# Patient Record
Sex: FEMALE | Race: WHITE | URBAN - METROPOLITAN AREA
[De-identification: names, ages, dates, MRNs, and addresses within clinical notes are randomized per-mention and may not be internally consistent; named-entity substitution may affect disease eponyms.]

---

## 2018-08-01 ENCOUNTER — OFFICE VISIT (OUTPATIENT)
Dept: FAMILY MEDICINE CLINIC | Age: 23
End: 2018-08-01

## 2018-08-01 VITALS
RESPIRATION RATE: 18 BRPM | TEMPERATURE: 98.6 F | HEIGHT: 62 IN | OXYGEN SATURATION: 98 % | WEIGHT: 148.1 LBS | SYSTOLIC BLOOD PRESSURE: 96 MMHG | BODY MASS INDEX: 27.25 KG/M2 | HEART RATE: 108 BPM | DIASTOLIC BLOOD PRESSURE: 68 MMHG

## 2018-08-01 DIAGNOSIS — Z3A.30 30 WEEKS GESTATION OF PREGNANCY: Primary | ICD-10-CM

## 2018-08-01 NOTE — PROGRESS NOTES
Kaykay Nguyễn  Identified pt with two pt identifiers(name and ). Chief Complaint Patient presents with  
 Other Referral for OB/GYN 1. Have you been to the ER, urgent care clinic since your last visit? Hospitalized since your last visit? no 
 
2. Have you seen or consulted any other health care providers outside of the [address] [place] [address] since your last visit? Include any pap smears or colon screening. no 
 
Today's provider has been notified of reason for visit, vitals and flowsheets obtained on patients. Patient received paperwork for advance directive during previous visit but has not completed at this time Reviewed record In preparation for visit, huddled with provider and have obtained necessary documentation Health Maintenance Due Topic  HPV Age 9Y-34Y (1 of 3 - Female 3 Dose Series)  DTaP/Tdap/Td series (1 - Tdap)  PAP AKA CERVICAL CYTOLOGY  Influenza Age 5 to Adult Wt Readings from Last 3 Encounters:  
18 148 lb 1.6 oz (67.2 kg) Temp Readings from Last 3 Encounters:  
18 98.6 °F (37 °C) (Temporal) BP Readings from Last 3 Encounters:  
18 96/68 Pulse Readings from Last 3 Encounters:  
18 (!) 108 Vitals:  
 18 0935 BP: 96/68 Pulse: (!) 108 Resp: 18 Temp: 98.6 °F (37 °C) TempSrc: Temporal  
SpO2: 98% Weight: 148 lb 1.6 oz (67.2 kg) Height: 5' 1.75\" (1.568 m) PainSc:   0 - No pain LMP: 2017 Learning Assessment: 
:  
 
Learning Assessment 2018 PRIMARY LEARNER Patient BARRIERS PRIMARY LEARNER NONE  
CO-LEARNER CAREGIVER No  
PRIMARY LANGUAGE ENGLISH  
LEARNER PREFERENCE PRIMARY READING  
ANSWERED BY patient RELATIONSHIP SELF Depression Screening: 
:  
 
PHQ over the last two weeks 2018 Little interest or pleasure in doing things Not at all Feeling down, depressed, irritable, or hopeless Not at all Total Score PHQ 2 0 Fall Risk Assessment: 
: Fall Risk Assessment, last 12 mths 8/1/2018 Able to walk? No  
 
 
Abuse Screening: 
:  
 
Abuse Screening Questionnaire 8/1/2018 Do you ever feel afraid of your partner? Bradley Amezquita Are you in a relationship with someone who physically or mentally threatens you? Bradley Amezquita Is it safe for you to go home? Y  
 
 
ADL Screening: 
:  
 
ADL Assessment 8/1/2018 Feeding yourself No Help Needed Getting from bed to chair No Help Needed Getting dressed No Help Needed Bathing or showering No Help Needed Walk across the room (includes cane/walker) No Help Needed Using the telphone No Help Needed Taking your medications No Help Needed Preparing meals No Help Needed Managing money (expenses/bills) No Help Needed Moderately strenuous housework (laundry) No Help Needed Shopping for personal items (toiletries/medicines) No Help Needed Shopping for groceries No Help Needed Driving No Help Needed Climbing a flight of stairs No Help Needed Getting to places beyond walking distances No Help Needed Medication reconciliation up to date and corrected with patient at this time.

## 2018-08-01 NOTE — PROGRESS NOTES
Chief Complaint Patient presents with  
 Other Referral for OB/GYN   
 
 
HPI: 
Esther Kerr is a 20y.o. year old female who is a new patient and is here to establish care. She was previously followed by PCP/OB in [place]. Her  is deployed at this time but she will return no [place] in December 2018. She is new to [facility] as of Monday, 8/30/2018. She is 30 weeks pregnant and needs urgent referral to OBGYN, Dr. Kate Vargas at [address] [place] [address]. The following sections were reviewed & updated as appropriate: PMH, PSH, PL, FMH, and SH. Past Medical History:  
Diagnosis Date  IBS (irritable bowel syndrome)  Vaginal delivery 05/25/2016 Normal healthy boy History reviewed. No pertinent surgical history. Patient Active Problem List  
Diagnosis Code  IBS (irritable bowel syndrome) K58.9  30 weeks gestation of pregnancy Z3A.30 Family History Problem Relation Age of Onset  No Known Problems Mother  Diabetes Father  Ulcerative Colitis Sister  No Known Problems Brother  No Known Problems Maternal Grandmother  No Known Problems Maternal Grandfather  No Known Problems Paternal Grandmother  No Known Problems Paternal Grandfather Social History Social History  Marital status: UNKNOWN Spouse name: N/A  
 Number of children: N/A  
 Years of education: N/A Occupational History  Not on file. Social History Main Topics  Smoking status: Never Smoker  Smokeless tobacco: Never Used  Alcohol use No  
 Drug use: No  
 Sexual activity: Yes  
  Partners: Female Birth control/ protection: None Other Topics Concern  Not on file Social History Narrative  No narrative on file Prior to Admission medications Medication Sig Start Date End Date Taking? Authorizing Provider  
prenatal vit-calcium-iron-fa (PRENATAL PLUS WITH CALCIUM) 27 mg iron- 1 mg tab Take 1 Tab by mouth daily.    Yes Historical Provider Allergies Allergen Reactions  Penicillins Rash Review of Systems A comprehensive review of systems was negative except for that written in the HPI. Objective: 
 
  
Visit Vitals  BP 96/68 (BP 1 Location: Right arm, BP Patient Position: Sitting)  Pulse (!) 108  Temp 98.6 °F (37 °C) (Temporal)  Resp 18  Ht 5' 1.75\" (1.568 m)  Wt 148 lb 1.6 oz (67.2 kg)  LMP 12/29/2017  SpO2 98%  BMI 27.31 kg/m2 Physical Exam 
Gen: alert, oriented, no acute distress Head: normocephalic, atraumatic Ears: external auditory canals clear, TMs without erythema or effusion Eyes: pupils equal round reactive to light, sclera clear, conjunctiva clear Oral: moist mucus membranes, no oral lesions, no pharyngeal inflammation or exudate Neck: symmetric normal sized thyroid, no carotid bruits, no jugular vein distention Resp: no increase work of breathing, lungs clear to ausculation bilaterally, no wheezing, rales or rhonchi CV: S1, S2 normal.  No murmurs, rubs, or gallops. Abd: soft, not tender, not distended. No hepatosplenomegaly. Normal bowel sounds. No hernias. No abdominal or renal bruits. Neuro: cranial nerves intact, normal strength and movement in all extremities, reflexes and sensation intact and symmetric. Skin: no lesion or rash Extremities: no cyanosis or edema Assessment & Plan: ICD-10-CM ICD-9-CM 1. 30 weeks gestation of pregnancy Z3A.30 V22.2 REFERRAL TO OBSTETRICS AND GYNECOLOGY Follow-up Disposition: 
Return if symptoms worsen or fail to improve. reviewed diet, exercise and weight control 
reviewed medications and side effects in detail Patient already has an appointment scheduled with Dr. Kendra De La Fuente on Wednesday, 8/8/2018. Pt asked to have records sent to us after her visit. Differential diagnosis and treatment options reviewed with patient who is in agreement with treatment plan as outlined below.  
 
Health Maintenance reviewed - deferred, awaiting results. Recommended healthy diet low in carbohydrates, fats, sodium and cholesterol. Recommended regular cardiovascular exercise 3-6 times per week for 30-60 minutes daily. Chart is reviewed and updated today in the office. Records requested for other providers patient has seen and is currently seeing. Patient was offered a choice/choices in the treatment plan today. Patient expresses understanding of the plan and agrees with recommendations. Verbal and written instructions (see AVS) provided. See patient instructions for more. Patient expresses understanding of diagnosis and treatment plan.

## 2018-08-01 NOTE — MR AVS SNAPSHOT
[address] 
[address] [place] [address] 
[phone] Patient: Michelle Form MRN: [id number] EMQ:[id number] Visit Information Date & Time Provider Department Dept. Phone Encounter #  
 8/1/2018  9:10 AM Steven Pope NP [facility] [phone] [id number] Follow-up Instructions Return if symptoms worsen or fail to improve. Upcoming Health Maintenance Date Due  
 HPV Age 9Y-34Y (1 of 3 - Female 3 Dose Series) 9/13/2018* DTaP/Tdap/Td series (1 - Tdap) 9/20/2018* Influenza Age 5 to Adult 10/17/2018* PAP AKA CERVICAL CYTOLOGY 11/15/2018* *Topic was postponed. The date shown is not the original due date. Allergies as of 8/1/2018  Review Complete On: 8/1/2018 By: Steven Pope NP Severity Noted Reaction Type Reactions Penicillins High 08/01/2018    Rash Current Immunizations  Never Reviewed No immunizations on file. Not reviewed this visit You Were Diagnosed With   
  
 Codes Comments 30 weeks gestation of pregnancy    -  Primary ICD-10-CM: Z3A.30 ICD-9-CM: V22.2 Vitals BP Pulse Temp Resp Height(growth percentile) Weight(growth percentile) 96/68 (BP 1 Location: Right arm, BP Patient Position: Sitting) (!) 108 98.6 °F (37 °C) (Temporal) 18 5' 1.75\" (1.568 m) 148 lb 1.6 oz (67.2 kg) LMP SpO2 BMI OB Status Smoking Status 12/29/2017 98% 27.31 kg/m2 Pregnant Never Smoker BMI and BSA Data Body Mass Index Body Surface Area  
 27.31 kg/m 2 1.71 m 2 Your Updated Medication List  
  
   
This list is accurate as of 8/1/18 10:10 AM.  Always use your most recent med list.  
  
  
  
  
 prenatal vit-calcium-iron-fa 27 mg iron- 1 mg Tab Commonly known as:  PRENATAL PLUS with CALCIUM Take 1 Tab by mouth daily. We Performed the Following REFERRAL TO OBSTETRICS AND GYNECOLOGY [REF51 Custom] Follow-up Instructions Return if symptoms worsen or fail to improve. Referral Information Referral ID Referred By Referred To  
  
 [id number] MD Suzette Curry [address] [place] [address] Phone: [phone] Fax: [phone] Visits Status Start Date End Date 1 New Request 18 If your referral has a status of pending review or denied, additional information will be sent to support the outcome of this decision. Patient Instructions Weeks 30 to 32 of Your Pregnancy: Care Instructions Your Care Instructions You have made it to the final months of your pregnancy. By now, your baby is really starting to look like a baby, with hair and plump skin. As you enter the final weeks of pregnancy, the reality of having a baby may start to set in. This is the time to settle on a name, get your household in order, set up a safe nursery, and find quality  if needed. Doing these things in advance will allow you to focus on caring for and enjoying your new baby. You may also want to have a tour of your hospital's labor and delivery unit to get a better idea of what to expect while you are in the hospital. 
During these last months, it is very important to take good care of yourself and pay attention to what your body needs. If your doctor says it is okay for you to work, don't push yourself too hard. Use the tips provided in this care sheet to ease heartburn and care for varicose veins. If you haven't already had the Tdap shot during this pregnancy, talk to your doctor about getting it. It will help protect your  against pertussis infection. Follow-up care is a key part of your treatment and safety. Be sure to make and go to all appointments, and call your doctor if you are having problems. It's also a good idea to know your test results and keep a list of the medicines you take. How can you care for yourself at home? Pay attention to your baby's movements · You should feel your baby move several times every day. · Your baby now turns less, and kicks and jabs more. · Your baby sleeps 20 to 45 minutes at a time and is more active at certain times of day. · If your doctor wants you to count your baby's kicks: 
¨ Empty your bladder, and lie on your side or relax in a comfortable chair. ¨ Write down your start time. ¨ Pay attention only to your baby's movements. Count any movement except hiccups. ¨ After you have counted 10 movements, write down your stop time. ¨ Write down how many minutes it took for your baby to move 10 times. ¨ If an hour goes by and you have not recorded 10 movements, have something to eat or drink and then count for another hour. If you do not record 10 movements in either hour, call your doctor. Ease heartburn · Eat small, frequent meals. · Do not eat chocolate, peppermint, or very spicy foods. Avoid drinks with caffeine, such as coffee, tea, and sodas. · Avoid bending over or lying down after meals. · Talk a short walk after you eat. · If heartburn is a problem at night, do not eat for 2 hours before bedtime. · Take antacids like Mylanta, Maalox, Rolaids, or Tums. Do not take antacids that have sodium bicarbonate. Care for varicose veins · Varicose veins are blood vessels that stretch out with the extra blood during pregnancy. Your legs may ache or throb. Most varicose veins will go away after the birth. · Avoid standing for long periods of time. Sit with your legs crossed at the ankles, not the knees. · Sit with your feet propped up. · Avoid tight clothing or stockings. Wear support hose. · Exercise regularly. Try walking for at least 30 minutes a day. Where can you learn more? Go to http://lakshmi-jaleel.info/. Enter Z353 in the search box to learn more about \"Weeks 30 to 32 of Your Pregnancy: Care Instructions. \" 
 Current as of: November 21, 2017 Content Version: 11.7 © 8682-7610 [employer], [employer]. Care instructions adapted under license by [employer] (which disclaims liability or warranty for this information). If you have questions about a medical condition or this instruction, always ask your healthcare professional. [address] any warranty or liability for your use of this information. Introducing [facility]! Ben Jackelynmoi introduces [facility] patient portal. Now you can access parts of your medical record, email your doctor's office, and request medication refills online. 1. In your internet browser, go to https://[employer]. [employer] 2. Click on the First Time User? Click Here link in the Sign In box. You will see the New Member Sign Up page. 3. Enter your [facility] Access Code exactly as it appears below. You will not need to use this code after youve completed the sign-up process. If you do not sign up before the expiration date, you must request a new code. · [facility] Access Code: [email or web address] Expires: 10/30/2018 10:10 AM 
 
4. Enter the last four digits of your Social Security Number (xxxx) and Date of Birth (mm/dd/yyyy) as indicated and click Submit. You will be taken to the next sign-up page. 5. Create a [facility] ID. This will be your [facility] login ID and cannot be changed, so think of one that is secure and easy to remember. 6. Create a [facility] password. You can change your password at any time. 7. Enter your Password Reset Question and Answer. This can be used at a later time if you forget your password. 8. Enter your e-mail address. You will receive e-mail notification when new information is available in [address] 9. Click Sign Up. You can now view and download portions of your medical record. 10. Click the Download Summary menu link to download a portable copy of your medical information. If you have questions, please visit the Frequently Asked Questions section of the [employer] website. Remember, [employer] is NOT to be used for urgent needs. For medical emergencies, dial 911. Now available from your iPhone and Android! Please provide this summary of care documentation to your next provider. Your primary care clinician is listed as Isidro Arias. If you have any questions after today's visit, please call [phone].

## 2018-08-01 NOTE — PATIENT INSTRUCTIONS
Weeks 30 to 32 of Your Pregnancy: Care Instructions Your Care Instructions You have made it to the final months of your pregnancy. By now, your baby is really starting to look like a baby, with hair and plump skin. As you enter the final weeks of pregnancy, the reality of having a baby may start to set in. This is the time to settle on a name, get your household in order, set up a safe nursery, and find quality  if needed. Doing these things in advance will allow you to focus on caring for and enjoying your new baby. You may also want to have a tour of your hospital's labor and delivery unit to get a better idea of what to expect while you are in the hospital. 
During these last months, it is very important to take good care of yourself and pay attention to what your body needs. If your doctor says it is okay for you to work, don't push yourself too hard. Use the tips provided in this care sheet to ease heartburn and care for varicose veins. If you haven't already had the Tdap shot during this pregnancy, talk to your doctor about getting it. It will help protect your  against pertussis infection. Follow-up care is a key part of your treatment and safety. Be sure to make and go to all appointments, and call your doctor if you are having problems. It's also a good idea to know your test results and keep a list of the medicines you take. How can you care for yourself at home? Pay attention to your baby's movements · You should feel your baby move several times every day. · Your baby now turns less, and kicks and jabs more. · Your baby sleeps 20 to 45 minutes at a time and is more active at certain times of day. · If your doctor wants you to count your baby's kicks: 
¨ Empty your bladder, and lie on your side or relax in a comfortable chair. ¨ Write down your start time. ¨ Pay attention only to your baby's movements. Count any movement except hiccups.  
¨ After you have counted 10 movements, write down your stop time. ¨ Write down how many minutes it took for your baby to move 10 times. ¨ If an hour goes by and you have not recorded 10 movements, have something to eat or drink and then count for another hour. If you do not record 10 movements in either hour, call your doctor. Ease heartburn · Eat small, frequent meals. · Do not eat chocolate, peppermint, or very spicy foods. Avoid drinks with caffeine, such as coffee, tea, and sodas. · Avoid bending over or lying down after meals. · Talk a short walk after you eat. · If heartburn is a problem at night, do not eat for 2 hours before bedtime. · Take antacids like Mylanta, Maalox, Rolaids, or Tums. Do not take antacids that have sodium bicarbonate. Care for varicose veins · Varicose veins are blood vessels that stretch out with the extra blood during pregnancy. Your legs may ache or throb. Most varicose veins will go away after the birth. · Avoid standing for long periods of time. Sit with your legs crossed at the ankles, not the knees. · Sit with your feet propped up. · Avoid tight clothing or stockings. Wear support hose. · Exercise regularly. Try walking for at least 30 minutes a day. Where can you learn more? Go to http://lakshmi-jaleel.info/. Enter N285 in the search box to learn more about \"Weeks 30 to 32 of Your Pregnancy: Care Instructions. \" Current as of: November 21, 2017 Content Version: 11.7 © 6981-6072 [employer]. Care instructions adapted under license by [employer] (which disclaims liability or warranty for this information). If you have questions about a medical condition or this instruction, always ask your healthcare professional. [place] [address] any warranty or liability for your use of this information.